# Patient Record
Sex: FEMALE | Race: WHITE | NOT HISPANIC OR LATINO | URBAN - METROPOLITAN AREA
[De-identification: names, ages, dates, MRNs, and addresses within clinical notes are randomized per-mention and may not be internally consistent; named-entity substitution may affect disease eponyms.]

---

## 2017-12-06 ENCOUNTER — AMBULATORY - HEALTHEAST (OUTPATIENT)
Dept: ADMINISTRATIVE | Facility: CLINIC | Age: 82
End: 2017-12-06

## 2017-12-12 ENCOUNTER — OFFICE VISIT - HEALTHEAST (OUTPATIENT)
Dept: GERIATRICS | Facility: CLINIC | Age: 82
End: 2017-12-12

## 2017-12-12 DIAGNOSIS — E03.9 HYPOTHYROIDISM: ICD-10-CM

## 2017-12-12 DIAGNOSIS — G30.1 LATE ONSET ALZHEIMER'S DISEASE WITHOUT BEHAVIORAL DISTURBANCE (H): ICD-10-CM

## 2017-12-12 DIAGNOSIS — F41.9 ANXIETY: ICD-10-CM

## 2017-12-12 DIAGNOSIS — F02.80 LATE ONSET ALZHEIMER'S DISEASE WITHOUT BEHAVIORAL DISTURBANCE (H): ICD-10-CM

## 2017-12-12 DIAGNOSIS — R26.81 GAIT INSTABILITY: ICD-10-CM

## 2017-12-12 DIAGNOSIS — M19.90 OSTEOARTHRITIS: ICD-10-CM

## 2018-01-30 ENCOUNTER — OFFICE VISIT - HEALTHEAST (OUTPATIENT)
Dept: GERIATRICS | Facility: CLINIC | Age: 83
End: 2018-01-30

## 2018-01-30 DIAGNOSIS — M19.90 OSTEOARTHRITIS: ICD-10-CM

## 2018-01-30 DIAGNOSIS — G30.1 LATE ONSET ALZHEIMER'S DISEASE WITHOUT BEHAVIORAL DISTURBANCE (H): ICD-10-CM

## 2018-01-30 DIAGNOSIS — R26.81 GAIT INSTABILITY: ICD-10-CM

## 2018-01-30 DIAGNOSIS — F41.9 ANXIETY: ICD-10-CM

## 2018-01-30 DIAGNOSIS — F02.80 LATE ONSET ALZHEIMER'S DISEASE WITHOUT BEHAVIORAL DISTURBANCE (H): ICD-10-CM

## 2018-01-30 DIAGNOSIS — E03.9 HYPOTHYROIDISM: ICD-10-CM

## 2018-01-30 ASSESSMENT — MIFFLIN-ST. JEOR: SCORE: 985.94

## 2018-02-09 ENCOUNTER — OFFICE VISIT - HEALTHEAST (OUTPATIENT)
Dept: GERIATRICS | Facility: CLINIC | Age: 83
End: 2018-02-09

## 2018-02-09 DIAGNOSIS — F41.9 ANXIETY: ICD-10-CM

## 2018-02-09 DIAGNOSIS — E03.9 HYPOTHYROIDISM: ICD-10-CM

## 2018-02-09 DIAGNOSIS — M19.90 OSTEOARTHRITIS: ICD-10-CM

## 2018-02-09 DIAGNOSIS — F02.80 LATE ONSET ALZHEIMER'S DISEASE WITHOUT BEHAVIORAL DISTURBANCE (H): ICD-10-CM

## 2018-02-09 DIAGNOSIS — G30.1 LATE ONSET ALZHEIMER'S DISEASE WITHOUT BEHAVIORAL DISTURBANCE (H): ICD-10-CM

## 2018-04-03 ENCOUNTER — OFFICE VISIT - HEALTHEAST (OUTPATIENT)
Dept: GERIATRICS | Facility: CLINIC | Age: 83
End: 2018-04-03

## 2018-04-03 DIAGNOSIS — F02.80 LATE ONSET ALZHEIMER'S DISEASE WITHOUT BEHAVIORAL DISTURBANCE (H): ICD-10-CM

## 2018-04-03 DIAGNOSIS — M19.90 OSTEOARTHRITIS: ICD-10-CM

## 2018-04-03 DIAGNOSIS — F41.9 ANXIETY: ICD-10-CM

## 2018-04-03 DIAGNOSIS — G30.1 LATE ONSET ALZHEIMER'S DISEASE WITHOUT BEHAVIORAL DISTURBANCE (H): ICD-10-CM

## 2018-04-03 DIAGNOSIS — E03.9 HYPOTHYROIDISM: ICD-10-CM

## 2018-04-03 DIAGNOSIS — R26.81 GAIT INSTABILITY: ICD-10-CM

## 2018-04-03 ASSESSMENT — MIFFLIN-ST. JEOR: SCORE: 980.49

## 2018-07-18 ENCOUNTER — HOME CARE/HOSPICE - HEALTHEAST (OUTPATIENT)
Dept: HOSPICE | Facility: HOSPICE | Age: 83
End: 2018-07-18

## 2018-07-19 ENCOUNTER — HOME CARE/HOSPICE - HEALTHEAST (OUTPATIENT)
Dept: HOSPICE | Facility: HOSPICE | Age: 83
End: 2018-07-19

## 2018-07-20 ENCOUNTER — RECORDS - HEALTHEAST (OUTPATIENT)
Dept: LAB | Facility: CLINIC | Age: 83
End: 2018-07-20

## 2018-07-20 LAB
ALBUMIN SERPL-MCNC: 2.3 G/DL (ref 3.5–5)
ALP SERPL-CCNC: 105 U/L (ref 45–120)
ALT SERPL W P-5'-P-CCNC: 10 U/L (ref 0–45)
ANION GAP SERPL CALCULATED.3IONS-SCNC: 7 MMOL/L (ref 5–18)
AST SERPL W P-5'-P-CCNC: 19 U/L (ref 0–40)
BILIRUB SERPL-MCNC: 0.3 MG/DL (ref 0–1)
BUN SERPL-MCNC: 26 MG/DL (ref 8–28)
CALCIUM SERPL-MCNC: 8.7 MG/DL (ref 8.5–10.5)
CHLORIDE BLD-SCNC: 108 MMOL/L (ref 98–107)
CO2 SERPL-SCNC: 27 MMOL/L (ref 22–31)
CREAT SERPL-MCNC: 0.73 MG/DL (ref 0.6–1.1)
GFR SERPL CREATININE-BSD FRML MDRD: >60 ML/MIN/1.73M2
GLUCOSE BLD-MCNC: 65 MG/DL (ref 70–125)
POTASSIUM BLD-SCNC: 3.7 MMOL/L (ref 3.5–5)
PROT SERPL-MCNC: 5.7 G/DL (ref 6–8)
SODIUM SERPL-SCNC: 142 MMOL/L (ref 136–145)

## 2018-07-23 ENCOUNTER — RECORDS - HEALTHEAST (OUTPATIENT)
Dept: LAB | Facility: CLINIC | Age: 83
End: 2018-07-23

## 2018-07-23 LAB
TSH SERPL DL<=0.005 MIU/L-ACNC: 3.05 UIU/ML (ref 0.3–5)
VIT B12 SERPL-MCNC: 285 PG/ML (ref 213–816)

## 2018-07-24 LAB — 25(OH)D3 SERPL-MCNC: 6.2 NG/ML (ref 30–80)

## 2018-08-21 ENCOUNTER — HOME CARE/HOSPICE - HEALTHEAST (OUTPATIENT)
Dept: HOSPICE | Facility: HOSPICE | Age: 83
End: 2018-08-21

## 2018-08-21 ENCOUNTER — RECORDS - HEALTHEAST (OUTPATIENT)
Dept: LAB | Facility: CLINIC | Age: 83
End: 2018-08-21

## 2018-08-22 LAB — 25(OH)D3 SERPL-MCNC: 30.6 NG/ML (ref 30–80)

## 2018-08-27 ENCOUNTER — RECORDS - HEALTHEAST (OUTPATIENT)
Dept: LAB | Facility: CLINIC | Age: 83
End: 2018-08-27

## 2018-08-27 LAB — ALBUMIN SERPL-MCNC: 2.4 G/DL (ref 3.5–5)

## 2018-08-28 LAB — 25(OH)D3 SERPL-MCNC: 29.8 NG/ML (ref 30–80)

## 2019-03-05 ENCOUNTER — RECORDS - HEALTHEAST (OUTPATIENT)
Dept: LAB | Facility: CLINIC | Age: 84
End: 2019-03-05

## 2019-03-06 LAB
ALBUMIN SERPL-MCNC: 2.2 G/DL (ref 3.5–5)
VIT B12 SERPL-MCNC: 1288 PG/ML (ref 213–816)

## 2019-03-07 LAB — 25(OH)D3 SERPL-MCNC: 44.6 NG/ML (ref 30–80)

## 2019-03-20 ENCOUNTER — RECORDS - HEALTHEAST (OUTPATIENT)
Dept: LAB | Facility: CLINIC | Age: 84
End: 2019-03-20

## 2019-03-20 LAB — VIT B12 SERPL-MCNC: 1438 PG/ML (ref 213–816)

## 2019-05-07 ENCOUNTER — RECORDS - HEALTHEAST (OUTPATIENT)
Dept: LAB | Facility: CLINIC | Age: 84
End: 2019-05-07

## 2019-05-08 LAB
ALBUMIN SERPL-MCNC: 2.2 G/DL (ref 3.5–5)
HGB BLD-MCNC: 12.1 G/DL (ref 12–16)
TSH SERPL DL<=0.005 MIU/L-ACNC: 3.99 UIU/ML (ref 0.3–5)
VIT B12 SERPL-MCNC: 1113 PG/ML (ref 213–816)

## 2019-10-09 ENCOUNTER — RECORDS - HEALTHEAST (OUTPATIENT)
Dept: LAB | Facility: CLINIC | Age: 84
End: 2019-10-09

## 2019-10-09 LAB
ANION GAP SERPL CALCULATED.3IONS-SCNC: 7 MMOL/L (ref 5–18)
BUN SERPL-MCNC: 17 MG/DL (ref 8–28)
CALCIUM SERPL-MCNC: 9.2 MG/DL (ref 8.5–10.5)
CHLORIDE BLD-SCNC: 103 MMOL/L (ref 98–107)
CO2 SERPL-SCNC: 28 MMOL/L (ref 22–31)
CREAT SERPL-MCNC: 0.8 MG/DL (ref 0.6–1.1)
GFR SERPL CREATININE-BSD FRML MDRD: >60 ML/MIN/1.73M2
GLUCOSE BLD-MCNC: 91 MG/DL (ref 70–125)
POTASSIUM BLD-SCNC: 3.8 MMOL/L (ref 3.5–5)
SODIUM SERPL-SCNC: 138 MMOL/L (ref 136–145)

## 2020-03-25 ENCOUNTER — RECORDS - HEALTHEAST (OUTPATIENT)
Dept: LAB | Facility: CLINIC | Age: 85
End: 2020-03-25

## 2020-03-28 LAB
COVID-19 - HISTORICAL: NEGATIVE
SPECIMEN STATUS: NORMAL

## 2021-05-31 VITALS — BODY MASS INDEX: 17.07 KG/M2 | WEIGHT: 119.2 LBS | HEIGHT: 70 IN

## 2021-06-01 VITALS — BODY MASS INDEX: 16.89 KG/M2 | WEIGHT: 118 LBS | HEIGHT: 70 IN

## 2021-06-14 NOTE — PROGRESS NOTES
[facility]      Facility:    [facility] [[id number]]    Code Status: DNR/DNI / Comfort Care / no ER-Hospitalization without discussion with family      Chief Complaint/Reason for Visit:  Chief Complaint   Patient presents with     H & P     Admission to Board and [facility] [place]         HPI:   Tasha is a 89 y.o. female who was followed in her memory care unit by [facility].  [facility] to be closer to her family.  She has severe Alzheimer's dementia, had been on hospice for 6 months in 2015.  She spends most of her time in bed.  She has anxiety she is on Celexa per family request  Hypothyroid  Osteoarthritis  Gait instability  Diffuse osteoarthritis  Per her pulse she is DNR/DNI/comfort care/avoid hospitalization and ED visits.  Discussion with family before decisions about hospitalization.      Past Medical History:  Past Medical History:   Diagnosis Date     Alzheimer's dementia      Anxiety      Gait instability      Hypothyroidism      Osteoarthritis            Surgical History:  No past surgical history on file.    Family History:   Family History   Problem Relation Age of Onset     Stroke grandfather grandfather    . Early dementia        Social History:    Social History     Social History     Marital status:      Spouse name: N/A     Number of children: N/A     Years of education: N/A     Social History Main Topics     Smoking status: Never Smoker     Smokeless tobacco: Never Used     Alcohol use No     Drug use: Not on file     Sexual activity: Not on file     Other Topics Concern     Not on file     Social History Narrative     No narrative on file          Review of Systems   Unable due to profound dementia, non verbal        Blood pressure 110/58, pulse 82, temperature 96.6  F (35.9  C), resp. rate 16, SpO2 95 %.      Physical Exam   Constitutional:   Elderly, thin, frail  female  No response to  visit from provider  In low position bed with  head of bed elevated   HENT:   Nose: Nose normal.   Dry oral membranes   Eyes: Conjunctivae are normal.   Cardiovascular: Regular rhythm and normal heart sounds.    Soft systolic murmur at the left lower sternal border   Pulmonary/Chest: She has no wheezes. She has no rales.   Abdominal: Soft. Bowel sounds are normal. She exhibits no distension. There is no tenderness.   Musculoskeletal: She exhibits no edema or deformity.   Follows no commands  Poor muscle tone, low bulk   Lymphadenopathy:     She has cervical adenopathy.   Neurological: She exhibits normal muscle tone.   Unable to assess, some muscle tone   Skin: Skin is warm and dry. No rash noted.   Psychiatric:   unable to assess            NKDA    Medication List:  Current Outpatient Prescriptions   Medication Sig     acetaminophen (TYLENOL) 500 MG tablet Take 1,000 mg by mouth 2 (two) times a day. And 1000 mg QD PRN     aluminum-magnesium hydroxide-simethicone (MAALOX ADVANCED) 200-200-20 mg/5 mL Susp Take 15 mL by mouth every 8 (eight) hours as needed.     bisacodyl (DULCOLAX) 10 mg suppository Insert 10 mg into the rectum daily as needed.     bismuth subsalicylate (PEPTO BISMOL) 262 mg/15 mL suspension Take 30 mL by mouth 5 x daily PRN for indigestion.     citalopram (CELEXA) 20 MG tablet Take 10 mg by mouth daily.     guaiFENesin (ROBITUSSIN) 100 mg/5 mL syrup Take 200 mg by mouth every 4 (four) hours as needed for cough.     levothyroxine (SYNTHROID, LEVOTHROID) 75 MCG tablet Take 75 mcg by mouth daily.     magnesium hydroxide (MAGNESIUM HYDROXIDE) 400 mg/5 mL Susp suspension Take 30 mL by mouth daily as needed.     senna-docusate (SENNOSIDES-DOCUSATE SODIUM) 8.6-50 mg tablet Take 1 tablet by mouth daily.       Labs:  August 2017  Sodium 141, potassium 3.6, chloride 100, CO2 23, BUN 16, creatinine 0.74, calcium 8.7  Hb 13.5    October 2017     TSH = 2.14     free T4  = 1.47    Assessment:    ICD-10-CM    1.  Late onset Alzheimer's disease without behavioral disturbance G30.1     F02.80    2. Hypothyroidism E03.9    3. Gait instability R26.81    4. Osteoarthritis M19.90    5. Anxiety F41.9          Plan:  Continue current medications and cares      Electronically signed by: Bela Garcia MD

## 2021-06-15 NOTE — PROGRESS NOTES
[facility]    Name:   Tasha Mcdonald (Wendie)  : 1920  Facility:   [facility] [[id number]]   Room: [facility] 145  Code Status: DNR/DNI - Comfort Care - no ER/hospitalization without discussion with family  Fac type:   [facility] ([facility]isted [facility]) -     CHIEF COMPLAINT / REASON FOR VISIT:  Chief Complaint   Patient presents with     Review Of Multiple Medical Conditions     Follow-up to new admission.    Patient was last seen by Dr. Garcia for an admission visit on 17.    HPI: Tasha (he lik prefers es to be called Wendie) is a 89 y.o. female with significant Alzheimer's dementia who was did come from a memory care unit, transferring to [facility] to be closer to her family.  She had been on hospice for 6 months in .  She spends most of her time in bed.  She has anxiety and is on citalopram per family request.  Other diagnoses include hypothyroidism, osteoarthritis, and gait instability.      CURRENT ISSUES    No particular issues.   She does have a propensity for falling and is on a low bed with a floor pad alongside.    She may not have been able to communicate with Dr. Garcia during the admission visit, but she is talking today.  She is complaining that she is thirsty, and water is provided for her.  She is able to sit through a straw with no difficulty.  She does have significant hearing loss.    ROS:  As noted, the patient likes to stay in bed.  Nursing staff report no headaches or chest pains, coughing or congestion, vomiting, constipation or diarrhea.    Past Medical History:   Diagnosis Date     Alzheimer's dementia      Anxiety      Gait instability      Hypothyroidism      Osteoarthritis               Family History   Problem Relation Age of Onset     Stroke Other      grandfather      Social History     Social History     Marital status:      Spouse name: N/A     Number of children: N/A      "Years of education: N/A     Social History Main Topics     Smoking status: Never Smoker     Smokeless tobacco: Never Used     Alcohol use No     Drug use: Not on file     Sexual activity: Not on file     Other Topics Concern     Not on file     Social History Narrative     No narrative on file       MEDICATIONS: Reviewed from the MAR, physician orders, and earlier progress notes.   Current Outpatient Prescriptions   Medication Sig     acetaminophen (TYLENOL) 500 MG tablet Take 500 mg by mouth 2 (two) times a day. And 1000 mg QD PRN      aluminum-magnesium hydroxide-simethicone (MAALOX ADVANCED) 200-200-20 mg/5 mL Susp Take 15 mL by mouth every 8 (eight) hours as needed.     bisacodyl (DULCOLAX) 10 mg suppository Insert 10 mg into the rectum daily as needed.     bismuth subsalicylate (PEPTO BISMOL) 262 mg/15 mL suspension Take 30 mL by mouth 5 x daily PRN for indigestion.     citalopram (CELEXA) 20 MG tablet Take 10 mg by mouth daily.     guaiFENesin (ROBITUSSIN) 100 mg/5 mL syrup Take 200 mg by mouth every 4 (four) hours as needed for cough.     levothyroxine (SYNTHROID, LEVOTHROID) 75 MCG tablet Take 75 mcg by mouth daily.     magnesium hydroxide (MAGNESIUM HYDROXIDE) 400 mg/5 mL Susp suspension Take 30 mL by mouth daily as needed.     senna-docusate (SENNOSIDES-DOCUSATE SODIUM) 8.6-50 mg tablet Take 1 tablet by mouth daily.     ALLERGIES: No Known Allergies    DIET: Regular, regular texture, thin liquids.    Vitals:    01/30/18 1623   BP: 122/64   Pulse: 69   Resp: 16   Temp: 97.1  F (36.2  C)   Weight: 119 lb 3.2 oz (54.1 kg)   Height: 5' 10\" (1.778 m)     Body mass index is 17.1 kg/(m^2).    EXAMINATION:   General: Frail appearing elderly female, lying in bed, in no apparent distress.  She is in a low bed with a blue floor pad alongside.  Head: Normocephalic and atraumatic.   Eyes: PERRLA, sclerae clear.   ENT: Moist oral mucosa.  She has her own teeth.  Significant hearing loss.  Cardiovascular: Regular rate " and rhythm.  2/6 systolic ejection murmur at the left sternal border.  Respiratory: Lungs clear to auscultation bilaterally.   Abdomen: Soft and nontender.   Musculoskeletal/Extremities: Age-related degenerative joint disease.  No peripheral edema.  Integument: Bruising noted on the left forearm.  Cognitive/Psychiatric: Clearly presenting with cognitive deficits.  There are no cognitive testing scores available.  Affect is somewhat flat.    DIAGNOSTICS:   No results found for this or any previous visit.  No results found for: WBC, HGB, HCT, MCV, PLT  CrCl cannot be calculated (No order found.).    ASSESSMENT/Plan:      ICD-10-CM    1. Late onset Alzheimer's disease without behavioral disturbance G30.1     F02.80    2. Anxiety F41.9    3. Gait instability R26.81    4. Hypothyroidism E03.9    5. Osteoarthritis M19.90      CHANGES:    None.    CARE PLAN:    The care plan has been reviewed and all orders signed. Changes to care plan, if any, as noted. Otherwise, continue care plan of care.    The above has been created using voice recognition software. Please be aware that this may unintentionally  produce inaccuracies and/or nonsensical sentences.      Electronically signed by: Joseph Cochran, CNP

## 2021-06-16 NOTE — PROGRESS NOTES
"[facility]      Facility:    [facility] [[id number]]    Code Status: DNR/DNI / Comfort Care / no ER-Hospitalization without discussion with family      Chief Complaint/Reason for Visit:  Chief Complaint   Patient presents with     Review Of Multiple Medical Conditions     dementia, LTC         HPI:   Tasha is a 89 y.o. female who was followed in her memory care unit by [facility].  [facility] to be closer to her family.  She has severe Alzheimer's dementia, had been on hospice for 6 months in 2015.  She spends most of her time in bed.  She has anxiety she is on Celexa per family request  Hypothyroid  Osteoarthritis  Gait instability  Diffuse osteoarthritis  Per her pulse she is DNR/DNI/comfort care/avoid hospitalization and ED visits.  Discussion with family before decisions about hospitalization.      Past Medical History:  Past Medical History:   Diagnosis Date     Alzheimer's dementia      Anxiety      Gait instability      Hypothyroidism      Osteoarthritis           Review of Systems       Her son is a retired ER physician, he is angry that Sanket had put his mother on an antidepressant.  Apparently Wendie his sister and daughter had suicide on antidepressants.  Sanket notes state \"per family request\" that Celexa was started.  I met with him briefly today we will honor his wishes.  She is profoundly demented and would be difficult to  benefit from Celexa        Blood pressure 102/78, pulse 78, temperature 97  F (36.1  C), resp. rate 18, SpO2 96 %.      Physical Exam      Elderly, thin, frail  female.  No attempt at social engagement during the visit, but kept eye contact  In low position bed with  head of bed elevated   Dry oral membranes   Cardiovascular: Regular rhythm and normal heart sounds.    Soft systolic murmur at the left lower sternal border   Pulmonary/Chest:  no wheezes,no " rales.   Abdominal: Soft. Bowel sounds are normal. She exhibits no distension.No apparent tenderness.   Musculoskeletal: She exhibits no edema or deformity. Poor muscle tone, low bulk  Moves all extremities  Neurological: Follows no commands. No verbalization  Skin: Skin is warm and dry. No rash noted.   Psychiatric: unable to assess         NKDA    Medication List:  Current Outpatient Prescriptions   Medication Sig     acetaminophen (TYLENOL) 500 MG tablet Take 500 mg by mouth 2 (two) times a day. And 1000 mg QD PRN      aluminum-magnesium hydroxide-simethicone (MAALOX ADVANCED) 200-200-20 mg/5 mL Susp Take 15 mL by mouth every 8 (eight) hours as needed.     bisacodyl (DULCOLAX) 10 mg suppository Insert 10 mg into the rectum daily as needed.     bismuth subsalicylate (PEPTO BISMOL) 262 mg/15 mL suspension Take 30 mL by mouth 5 x daily PRN for indigestion.     guaiFENesin (ROBITUSSIN) 100 mg/5 mL syrup Take 200 mg by mouth every 4 (four) hours as needed for cough.     levothyroxine (SYNTHROID, LEVOTHROID) 75 MCG tablet Take 75 mcg by mouth daily.     magnesium hydroxide (MAGNESIUM HYDROXIDE) 400 mg/5 mL Susp suspension Take 30 mL by mouth daily as needed.     senna-docusate (SENNOSIDES-DOCUSATE SODIUM) 8.6-50 mg tablet Take 1 tablet by mouth daily.       Labs:  August 2017  Sodium 141, potassium 3.6, chloride 100, CO2 23, BUN 16, creatinine 0.74, calcium 8.7  Hb 13.5    October 2017     TSH = 2.14     free T4  = 1.47    Assessment:    ICD-10-CM    1. Late onset Alzheimer's disease without behavioral disturbance G30.1     F02.80    2. Anxiety F41.9    3. Hypothyroidism E03.9    4. Osteoarthritis M19.90            Plan:  Mostly stays in bed  Continue current medications and cares      Electronically signed by: Blea Garcia MD

## 2021-06-17 NOTE — PROGRESS NOTES
"[facility]    Name:   Tasha Mcdonald (Wendie)  : 1920  Facility:   [facility] [[id number]]  Room: [facility] 145  Code Status: DNR/DNI - Comfort Care - no ER/hospitalization without discussion with family  Fac type:   [facility] ([facility], [facility], Assisted Living) -     CHIEF COMPLAINT / REASON FOR VISIT:  Chief Complaint   Patient presents with     Review Of Multiple Medical Conditions     The patient was last seen by Ney Cochran on 2018. She was subsequently seen by Dr. Garcia on 2018.     HPI: Tasha (she prefers to be called Covina) is a 89 y.o. female with significant Alzheimer's dementia who was did come from a memory care unit, transferring to [facility] to be closer to her family.  She had been on hospice for 6 months in . She spends most of her time in bed. She has anxiety and is on citalopram per family request. Other diagnoses include hypothyroidism, osteoarthritis, and gait instability.    CURRENT ISSUES    No particular issues. She does have a propensity for falling and is on a low bed.    She may not have been able to communicate with Dr. Garcia during the admission visit, but today she does state, \"Turn off the lights,\" at the end of the exam. Otherwise, she does not respond to any questions posed to her during today's visit. She does have significant hearing loss.    ROS:  Nursing staff report no headaches or chest pains, coughing or congestion, vomiting, constipation or diarrhea. There are no reports of issues with sleep or appetite.     Past Medical History:   Diagnosis Date     Alzheimer's dementia      Anxiety      Gait instability      Hypothyroidism      Osteoarthritis               Family History   Problem Relation Age of Onset     Stroke Other      grandfather      Social History     Social History     Marital status:      Spouse name: N/A     Number of children: N/A     Years of education: " "N/A     Social History Main Topics     Smoking status: Never Smoker     Smokeless tobacco: Never Used     Alcohol use No     Drug use: Not on file     Sexual activity: Not on file     Other Topics Concern     Not on file     Social History Narrative     No narrative on file     MEDICATIONS: Reviewed from the MAR, physician orders, and earlier progress notes.   Current Outpatient Prescriptions   Medication Sig     acetaminophen (TYLENOL) 500 MG tablet Take 500 mg by mouth 2 (two) times a day. And 1000 mg QD PRN      aluminum-magnesium hydroxide-simethicone (MAALOX ADVANCED) 200-200-20 mg/5 mL Susp Take 15 mL by mouth every 8 (eight) hours as needed.     bisacodyl (DULCOLAX) 10 mg suppository Insert 10 mg into the rectum daily as needed.     bismuth subsalicylate (PEPTO BISMOL) 262 mg/15 mL suspension Take 30 mL by mouth 5 x daily PRN for indigestion.     guaiFENesin (ROBITUSSIN) 100 mg/5 mL syrup Take 200 mg by mouth every 4 (four) hours as needed for cough.     levothyroxine (SYNTHROID, LEVOTHROID) 75 MCG tablet Take 75 mcg by mouth daily.     magnesium hydroxide (MAGNESIUM HYDROXIDE) 400 mg/5 mL Susp suspension Take 30 mL by mouth daily as needed.     senna-docusate (SENNOSIDES-DOCUSATE SODIUM) 8.6-50 mg tablet Take 1 tablet by mouth daily.     ALLERGIES: No Known Allergies    DIET: Regular, regular texture, thin liquids. Utilizing a 206 juice supplement.     Vitals:    04/03/18 1212   BP: 110/62   Pulse: 72   Resp: 18   Temp: 97.2  F (36.2  C)   Weight: 118 lb (53.5 kg)   Height: 5' 10\" (1.778 m)     Body mass index is 16.93 kg/(m^2).    EXAMINATION:   General: Frail appearing elderly female, lying in bed, in no apparent distress. She is in a low bed, and is cooperative during today's exam. She does speak occasionally.   Head: Normocephalic and atraumatic.   Eyes: PERRLA, sclerae clear.   ENT: Moist oral mucosa. She has her own teeth. Significant hearing loss.  Cardiovascular: Regular rate and rhythm. 2/6 systolic " ejection murmur at the left sternal border.  Respiratory: Right lower lobe basilar rales are evident upon ascultation. All additional lung fields are notably clear.    Abdomen: Soft and non-tender.   Musculoskeletal/Extremities: Age-related degenerative joint disease. No peripheral edema.  Integument: Bruising noted on the left forearm.  Cognitive/Psychiatric: Clearly presenting with cognitive deficits. There are no cognitive testing scores available. Affect is somewhat flat.    DIAGNOSTICS:   No results found for this or any previous visit.  No results found for: WBC, HGB, HCT, MCV, PLT  CrCl cannot be calculated (No order found.).    ASSESSMENT/Plan:      ICD-10-CM    1. Late onset Alzheimer's disease without behavioral disturbance G30.1     F02.80    2. Anxiety F41.9    3. Gait instability R26.81    4. Hypothyroidism E03.9    5. Osteoarthritis M19.90      CHANGES:    None.    CARE PLAN:    The care plan has been reviewed and all orders signed. Changes to care plan, if any, as noted. Otherwise, continue care plan of care.    Ney Cochran was present at the bedside during this visit. He has reviewed, and is in agreement, with the information provided in this note.     Electronically signed by: Deysi Nair    Electronically signed by: Joseph Cochran, MARIE